# Patient Record
Sex: MALE | Race: WHITE | Employment: FULL TIME | URBAN - METROPOLITAN AREA
[De-identification: names, ages, dates, MRNs, and addresses within clinical notes are randomized per-mention and may not be internally consistent; named-entity substitution may affect disease eponyms.]

---

## 2022-08-30 ENCOUNTER — OFFICE VISIT (OUTPATIENT)
Dept: CARDIOLOGY CLINIC | Age: 57
End: 2022-08-30
Payer: COMMERCIAL

## 2022-08-30 VITALS
WEIGHT: 275 LBS | OXYGEN SATURATION: 96 % | DIASTOLIC BLOOD PRESSURE: 86 MMHG | BODY MASS INDEX: 33.49 KG/M2 | HEART RATE: 75 BPM | HEIGHT: 76 IN | SYSTOLIC BLOOD PRESSURE: 128 MMHG | RESPIRATION RATE: 22 BRPM

## 2022-08-30 DIAGNOSIS — E78.00 HYPERCHOLESTEREMIA: ICD-10-CM

## 2022-08-30 DIAGNOSIS — I47.1 PSVT (PAROXYSMAL SUPRAVENTRICULAR TACHYCARDIA) (HCC): Primary | ICD-10-CM

## 2022-08-30 DIAGNOSIS — I21.A1 MYOCARDIAL INFARCTION TYPE 2 (HCC): ICD-10-CM

## 2022-08-30 DIAGNOSIS — I77.810 AORTIC ROOT DILATION (HCC): ICD-10-CM

## 2022-08-30 DIAGNOSIS — I10 HYPERTENSION, ESSENTIAL: ICD-10-CM

## 2022-08-30 DIAGNOSIS — I51.7 ATRIAL ENLARGEMENT, BILATERAL: ICD-10-CM

## 2022-08-30 DIAGNOSIS — E66.09 CLASS 1 OBESITY DUE TO EXCESS CALORIES WITHOUT SERIOUS COMORBIDITY WITH BODY MASS INDEX (BMI) OF 33.0 TO 33.9 IN ADULT: ICD-10-CM

## 2022-08-30 DIAGNOSIS — E03.9 HYPOTHYROIDISM, UNSPECIFIED TYPE: ICD-10-CM

## 2022-08-30 DIAGNOSIS — R00.2 PALPITATIONS: ICD-10-CM

## 2022-08-30 DIAGNOSIS — R77.8 ELEVATED TROPONIN LEVEL NOT DUE TO ACUTE CORONARY SYNDROME: ICD-10-CM

## 2022-08-30 PROCEDURE — 93000 ELECTROCARDIOGRAM COMPLETE: CPT | Performed by: SPECIALIST

## 2022-08-30 PROCEDURE — 99204 OFFICE O/P NEW MOD 45 MIN: CPT | Performed by: SPECIALIST

## 2022-08-30 RX ORDER — METOPROLOL SUCCINATE 25 MG/1
TABLET, EXTENDED RELEASE ORAL
COMMUNITY
Start: 2022-08-24

## 2022-08-30 RX ORDER — IVERMECTIN 10 MG/G
CREAM TOPICAL
COMMUNITY

## 2022-08-30 RX ORDER — HYDROGEN PEROXIDE 3 %
SOLUTION, NON-ORAL MISCELLANEOUS
COMMUNITY
Start: 2007-12-01

## 2022-08-30 RX ORDER — SIMVASTATIN 40 MG/1
40 TABLET, FILM COATED ORAL EVERY EVENING
COMMUNITY
Start: 2022-08-21

## 2022-08-30 RX ORDER — KETOCONAZOLE 20 MG/ML
SHAMPOO TOPICAL
COMMUNITY

## 2022-08-30 RX ORDER — MONTELUKAST SODIUM 10 MG/1
10 TABLET ORAL DAILY
COMMUNITY
Start: 2022-08-19

## 2022-08-30 RX ORDER — CLOBETASOL PROPIONATE 0.5 MG/G
AEROSOL, FOAM TOPICAL
COMMUNITY

## 2022-08-30 RX ORDER — LEVOTHYROXINE SODIUM 50 UG/1
TABLET ORAL
COMMUNITY
Start: 2022-07-19

## 2022-08-30 RX ORDER — OMEPRAZOLE 40 MG/1
CAPSULE, DELAYED RELEASE ORAL
COMMUNITY
Start: 2022-08-13

## 2022-08-30 RX ORDER — LEVOTHYROXINE SODIUM 100 UG/1
CAPSULE ORAL
COMMUNITY
Start: 2007-12-01

## 2022-08-30 NOTE — Clinical Note
10/2/2022    Patient: Lucía Blackwell   YOB: 1965   Date of Visit: 8/30/2022     Janelle Mckeon MD  [address] [address]  Via Fax: [phone]    Dear Janelle Mckeon MD,      Thank you for referring Mr. Monie Gomez to [address] for evaluation. My notes for this consultation are attached. If you have questions, please do not hesitate to call me. I look forward to following your patient along with you.       Sincerely,    Charito Snyder MD

## 2022-10-02 PROBLEM — R00.2 PALPITATIONS: Status: ACTIVE | Noted: 2022-10-02

## 2022-10-02 PROBLEM — E66.811 CLASS 1 OBESITY DUE TO EXCESS CALORIES WITHOUT SERIOUS COMORBIDITY WITH BODY MASS INDEX (BMI) OF 33.0 TO 33.9 IN ADULT: Status: ACTIVE | Noted: 2022-10-02

## 2022-10-02 PROBLEM — I51.7 ATRIAL ENLARGEMENT, BILATERAL: Status: ACTIVE | Noted: 2022-10-02

## 2022-10-02 PROBLEM — I47.1 PSVT (PAROXYSMAL SUPRAVENTRICULAR TACHYCARDIA) (HCC): Status: ACTIVE | Noted: 2022-10-02

## 2022-10-02 PROBLEM — E78.00 HYPERCHOLESTEREMIA: Status: ACTIVE | Noted: 2022-10-02

## 2022-10-02 PROBLEM — R77.8 ELEVATED TROPONIN LEVEL NOT DUE TO ACUTE CORONARY SYNDROME: Status: ACTIVE | Noted: 2022-10-02

## 2022-10-02 PROBLEM — R79.89 ELEVATED TROPONIN LEVEL NOT DUE TO ACUTE CORONARY SYNDROME: Status: ACTIVE | Noted: 2022-10-02

## 2022-10-02 PROBLEM — E03.9 HYPOTHYROIDISM: Status: ACTIVE | Noted: 2022-10-02

## 2022-10-02 PROBLEM — I10 HYPERTENSION, ESSENTIAL: Status: ACTIVE | Noted: 2022-10-02

## 2022-10-02 PROBLEM — I47.10 PSVT (PAROXYSMAL SUPRAVENTRICULAR TACHYCARDIA): Status: ACTIVE | Noted: 2022-10-02

## 2022-10-02 PROBLEM — E66.09 CLASS 1 OBESITY DUE TO EXCESS CALORIES WITHOUT SERIOUS COMORBIDITY WITH BODY MASS INDEX (BMI) OF 33.0 TO 33.9 IN ADULT: Status: ACTIVE | Noted: 2022-10-02

## 2022-10-02 PROBLEM — I77.810 AORTIC ROOT DILATION (HCC): Status: ACTIVE | Noted: 2022-10-02

## 2022-10-03 NOTE — PROGRESS NOTES
Florence Moreno Ed     1965       Khanh Parra MD, [facility]  Date of Visit-08/30/2022   PCP is Nadege Pepper MD   [facility] [place]  [facility]  HPI:  Adán Correa is a 56 y.o. male   New patient with hx of SVT-had episode while traveling in [facility] in 8/23/22  Takes BB for past week, also takes statin, thyroid med and GERD med for past 15 years   EKG:   NSR at 77 , RSR' in V1 , QRS is 110 msec so incomplete RBBB with borderline left axis  Discussed with patient and records reviewed   [facility] in [place] echocardiogram 8/24/2022 mild increase in left atrial size LVEF 67% mild right atrial enlargement. Normal RV trivial MR and TR no significant valve disease normal diastolic filling proximal aorta moderately enlarged measurement appears to be 4.5 cm at the root a sending 4.2 cm. ER record coronary CT angiogram normal coronaries calcium score of 0 EF 63% EKG sinus rhythm left axis deviation LVH possibly  Discharge summary 8/23/2022 to 8/24/2022 admission for SVT history of hypertension dyslipidemia hypothyroidism obesity presented with chest pain found of SVT by EMS got adenosine troponin was elevated 0.41 and he was considered to have a type II NSTEMI and thus underwent the coronary CTA. No rhythm strips were available from the field patient was discharged on metoprolol. His LDL is 142 and is on simvastatin 40 recently prescribed metformin for weight loss. He is also been on phentermine. Assessment/Plan: This patient had SVT on 8/23/2022 thought possibly related to phentermine which has been stopped. He was placed at that time on metoprolol his TSH was normal troponin was elevated at 0.41 and he underwent a coronary CTA which was normal.  His echo was remarkable only for an aortic root size of 4.5 cm and mild biatrial enlargement. He has felt some symptoms of palpitations shortness of breath but they have all improved.     We went over SVT and its episodic nature this could have been provoked from tyramine but he also could have a differential diagnosis of an AV shayy reentry tachycardia PAT or even PAF. Unfortunately do not have strips from when he was in the emergency room or EMS in the hospital in [facility] did not have EMS records of the strips. He did respond to adenosine which is helpful to identify as a likely SVT. We talked about possible ablation if the symptoms were to persist and condition persist but that in many patients this may be a single isolated episode and his may have had a trigger. Much of this information of the record is reviewed with him and was knowledgeable to him at that time. Overall we will plan a period of watchful waiting since he is already had an echo and coronary CTA no further testing is needed if he has prolonged and persistent episodes he should be considered for ablation therapy or continue medical therapy but perhaps with a and further antiarrhythmic. That would be a patient choice though often times ablation can be curative which we discussed. If he does not have a lot of episodes I think at some point he can be weaned off the stop the beta-blocker and then monitored for further events. He is comfortable with plans and will see us back in December. Patient Instructions   We will see you back in December. The primary encounter diagnosis was PSVT (paroxysmal supraventricular tachycardia) ([facility] [address] Diagnoses of Myocardial infarction type 2 [facility]), Atrial enlargement, bilateral, Aortic root dilation (HCC), Palpitations, Hypertension, essential, Hypercholesteremia, Elevated troponin level not due to acute coronary syndrome, Hypothyroidism, unspecified type, and Class 1 obesity due to excess calories without serious comorbidity with body mass index (BMI) of 33.0 to 33.9 in adult were also pertinent to this visit. Impression:    1. PSVT (paroxysmal supraventricular tachycardia) ([address]    2. Myocardial infarction type 2 ([address]    3. Atrial enlargement, bilateral    4. Aortic root dilation (HCC)    5. Palpitations    6. Hypertension, essential    7. Hypercholesteremia    8. Elevated troponin level not due to acute coronary syndrome    9. Hypothyroidism, unspecified type    10. Class 1 obesity due to excess calories without serious comorbidity with body mass index (BMI) of 33.0 to 33.9 in adult       Future Appointments   Date Time Provider Joni Frosti   2022  8:40 AM MD KARISHMA Pineda AMB      Patient Care Team:  Wicho Muniz MD as PCP - General (Family Medicine)  Wicho Muniz MD as PCP - [facility] Provider     Medical Hx= Jaya Germain  No prior cardiac cath, blood transfusion, or GI bleeding  Social Hx= No  tobacco, 6-8 alcohol drinks a week, 2 children, , [employer], drinks 2 cups coffee daily  Family Hx= Mother  76 of colon cancer , Father  64 of MI , Siblings brother  at 54 of Etoh abuse and MI; sister  at 54 of asphyixation due to possile OD    Allergies   Allergen Reactions    Codeine Nausea and Vomiting          ROS:  REVIEW OF SYMPTOMS: A  full 12 system ROS is reviewed with aid of new patient form  see scanned new patient worksheet. Review of Systems   Constitutional:  Negative for diaphoresis, fever and malaise/fatigue. HENT:  Positive for tinnitus. Negative for ear pain, hearing loss and nosebleeds. Eyes:  Negative for blurred vision, double vision and pain. Respiratory:  Positive for shortness of breath. Negative for cough, hemoptysis, sputum production, wheezing and stridor. Cardiovascular:  Positive for palpitations. Negative for chest pain, orthopnea, claudication and leg swelling. Gastrointestinal:  Positive for heartburn. Negative for abdominal pain, blood in stool, constipation, diarrhea, nausea and vomiting. Genitourinary:  Positive for frequency. Negative for dysuria and urgency.    Musculoskeletal: Negative for back pain, falls and joint pain. Skin:  Negative for rash. Neurological:  Positive for dizziness. Negative for seizures, loss of consciousness, weakness and headaches. Endo/Heme/Allergies:  Does not bruise/bleed easily. Psychiatric/Behavioral:  Negative for depression, hallucinations and memory loss. The patient is not nervous/anxious and does not have insomnia. Exam and Labs:  Visit Vitals  /86 (BP 1 Location: Left upper arm, BP Patient Position: Sitting, BP Cuff Size: Adult)   Pulse 75   Resp 22   Ht 6' 4\" (1.93 m)   Wt 275 lb (124.7 kg)   SpO2 96%   BMI 33.47 kg/m²      Constitutional:  NAD, comfortable ,   Head: NC,AT. Eyes: No scleral icterus. Neck:  Neck supple. No JVD present. Throat: moist mucous membranes. Chest: Effort normal & normal respiratory excursion . Neurological: alert, conversant and oriented . Skin: Skin is not cold. No obvious systemic rash noted. Not diaphoretic. No erythema. Psychiatric:  Grossly normal mood and affect. Behavior appears normal. Extremities:  no clubbing or cyanosis. Abdomen: non distended    Lungs:breath sounds normal. No stridor. distress, wheezes or  Rales. Heart:normal rate, regular rhythm, normal S1, S2, no murmurs, rubs, clicks or gallops , PMI non displaced. Edema: Edema is none. No results found for: CHOL, CHOLX, CHLST, CHOLV, HDL, HDLP, LDL, LDLC, DLDLP, TGLX, TRIGL, TRIGP, CHHD, CHHDX  No results found for this or any previous visit.  No results found for: NA, K, CL, CO2, AGAP, GLU, BUN, CREA, BUCR, GFRAA, GFRNA, CA, GFRAA   Wt Readings from Last 3 Encounters:   08/30/22 275 lb (124.7 kg)      BP Readings from Last 3 Encounters:   08/30/22 128/86        Current Outpatient Medications   Medication Sig    clobetasoL (OLUX) 0.05 % topical foam 1 application to affected area    esomeprazole (NEXIUM) 20 mg capsule     ivermectin 1 % crea APPLY 1 (ONE) APPLICATION TO THE ENTIRE FACE TWICE A DAY, MAY DECREASE TO ONCE A DAY IF IMPROVED    ketoconazole (NIZORAL) 2 % shampoo     levothyroxine (SYNTHROID) 50 mcg tablet     metoprolol succinate (TOPROL-XL) 25 mg XL tablet     montelukast (SINGULAIR) 10 mg tablet Take 10 mg by mouth daily. omeprazole (PRILOSEC) 40 mg capsule     simvastatin (ZOCOR) 40 mg tablet Take 40 mg by mouth every evening. Levothyroxine 100 mcg cap  (Patient not taking: Reported on 8/30/2022)     No current facility-administered medications for this visit. Impression see above.

## 2022-12-12 ENCOUNTER — OFFICE VISIT (OUTPATIENT)
Dept: CARDIOLOGY CLINIC | Age: 57
End: 2022-12-12
Payer: COMMERCIAL

## 2022-12-12 VITALS
RESPIRATION RATE: 18 BRPM | HEIGHT: 76 IN | OXYGEN SATURATION: 98 % | DIASTOLIC BLOOD PRESSURE: 82 MMHG | WEIGHT: 276 LBS | HEART RATE: 65 BPM | SYSTOLIC BLOOD PRESSURE: 136 MMHG | BODY MASS INDEX: 33.61 KG/M2

## 2022-12-12 DIAGNOSIS — I21.A1 MYOCARDIAL INFARCTION TYPE 2 (HCC): ICD-10-CM

## 2022-12-12 DIAGNOSIS — I77.810 AORTIC ROOT DILATION (HCC): ICD-10-CM

## 2022-12-12 DIAGNOSIS — I47.1 PSVT (PAROXYSMAL SUPRAVENTRICULAR TACHYCARDIA) (HCC): Primary | ICD-10-CM

## 2022-12-12 DIAGNOSIS — E78.00 HYPERCHOLESTEREMIA: ICD-10-CM

## 2022-12-12 DIAGNOSIS — I10 HYPERTENSION, ESSENTIAL: ICD-10-CM

## 2022-12-12 PROCEDURE — 3079F DIAST BP 80-89 MM HG: CPT | Performed by: SPECIALIST

## 2022-12-12 PROCEDURE — 99214 OFFICE O/P EST MOD 30 MIN: CPT | Performed by: SPECIALIST

## 2022-12-12 PROCEDURE — 3074F SYST BP LT 130 MM HG: CPT | Performed by: SPECIALIST

## 2022-12-12 NOTE — PROGRESS NOTES
Chin Ward     1965       Khanh Parra MD, [facility]  Date of Visit-12/12/2022   PCP is Devi Ralph MD   [facility] and [facility] [place]  [facility]  HPI:  Dinesh Payton is a 56 y.o. male   4 month follow seen in August with hx of   SVT-had episode while traveling in [facility] in 8/23/22--Takes BB for past week, also takes statin, thyroid med and GERD med for past 15 years   Previous EKG:   NSR at 77 , RSR' in V1 , QRS is 110 msec so incomplete RBBB with borderline left axis  [facility] in [facility] echocardiogram 8/24/2022 mild increase in left atrial size LVEF 67% mild right atrial enlargement. Normal RV trivial MR and TR no significant valve disease normal diastolic filling proximal aorta moderately enlarged measurement appears to be 4.5 cm at the root a sending 4.2 cm. ER record coronary CT angiogram normal coronaries calcium score of 0 EF 63% , EKG sinus rhythm left axis deviation LVH possibly  Discharge summary 8/23/2022 to 8/24/2022 admission for SVT history of hypertension dyslipidemia hypothyroidism obesity presented with chest pain found of   VT by EMS got adenosine troponin was elevated 0.41 and he was considered to have a type II NSTEMI and thus underwent the coronary CTA. No rhythm strips were available from the field patient was discharged on metoprolol. His LDL is 142 and is on simvastatin 40 recently prescribed metformin for weight loss. He is also been on phentermine. Today doing well no complaints  Starting to exercise more  Denies chest pain, edema, syncope or shortness of breath at rest   Has no tachycardia , palpitations or sense of arrythmia    Assessment/Plan:   Patient Instructions   Your blood pressure once a day after get a blood pressure cuff. Once you have 10 numbers let us know what the values are. If we then decide to stop the metoprolol then get another 10 days of values and let us know.   Our goal was for the top number the systolic pressure to average around 120 when you are in the resting position but will accept below 135. You can hold onto the beta-blocker to use 1 or 2 tablets if you develop an acute episode of SVT and do not forget the vagal maneuvers. I will see you back in a year   1. PSVT (paroxysmal supraventricular tachycardia) ([address]  Single episode while traveling in [facility] had an extensive work-up and has been on a beta-blocker. He can stop that now and then use that as needed in addition to vagal maneuvers. We went over if it became frequent that he can consider ablation but at this point this may be infrequent and daily medicine would not be needed. If he needs a beta-blocker for blood pressure anyway that we will continue    2. Aortic root dilation (HCC)  Is a tall man with a BMI of 33 so his aortic root size is not particularly aneurysmal.  After his next visit in a year we will follow-up with an echo likely. 3. Myocardial infarction type 2 ([address]  Normal coronaries by CTA    4. Hypertension, essential  BP is borderline see above for plans   5. Hypercholesteremia  up with PCP      This patient had SVT on 8/23/2022 thought possibly related to phentermine which has been stopped. He was placed at that time on metoprolol his TSH was normal troponin was elevated at 0.41 and he underwent a coronary CTA which was normal.  His echo was remarkable only for an aortic root size of 4.5 cm and mild biatrial enlargement. Impression:    1. PSVT (paroxysmal supraventricular tachycardia) ([address]    2. Aortic root dilation (HCC)    3. Myocardial infarction type 2 ([address]    4. Hypertension, essential    5. Hypercholesteremia       No future appointments.      Patient Care Team:  Jewel Trivedi MD as PCP - General (Family Medicine)  Jewel Trivedi MD as PCP - [facility]     PSVT  normal cors CTA  Medical Hx= XOL,GERD  No prior cardiac cath, blood transfusion, or GI bleeding  Social Hx= No  tobacco, 6-8 alcohol drinks a week, 2 children, , likes golf, drinks 2 cups coffee daily  Family Hx= Mother  76 of colon cancer , Father  64 of MI , Siblings brother  at 54 of Etoh abuse and MI; sister  at 54 of asphyixation due to possile OD     Allergies   Allergen Reactions    Codeine Nausea and Vomiting          ROS:  ROS:Cardiac as above. Respiratory as above with no wheezing or hemoptysis. He denies  symptoms of unusual weight loss , fevers, night sweats, BRBPR, hematuria, seizure or recent stroke        Exam and Labs:  Visit Vitals  /82 (BP 1 Location: Left upper arm, BP Patient Position: Sitting, BP Cuff Size: Adult)   Pulse 65   Resp 18   Ht 6' 4\" (1.93 m)   Wt 276 lb (125.2 kg)   SpO2 98%   BMI 33.60 kg/m²        Constitutional:  NAD, comfortable ,   Head: NC,AT. Eyes: No scleral icterus. Neck:  Neck supple. No JVD present. Throat: moist mucous membranes. Chest: Effort normal & normal respiratory excursion . Neurological: alert, conversant and oriented . Skin: Skin is not cold. No obvious systemic rash noted. Not diaphoretic. No erythema. Psychiatric:  Grossly normal mood and affect. Behavior appears normal. Extremities:  no clubbing or cyanosis. Abdomen: non distended    Lungs:breath sounds normal. No stridor. distress, wheezes or  Rales. Heart:normal rate, regular rhythm, normal S1, S2, no murmurs, rubs, clicks or gallops , PMI non displaced. Edema: Edema is none. No results found for: CHOL, CHOLX, CHLST, CHOLV, HDL, HDLP, LDL, LDLC, DLDLP, TGLX, TRIGL, TRIGP, CHHD, CHHDX  No results found for this or any previous visit.  No results found for: NA, K, CL, CO2, AGAP, GLU, BUN, CREA, BUCR, GFRAA, GFRNA, CA, GFRAA   Wt Readings from Last 3 Encounters:   22 276 lb (125.2 kg)   22 275 lb (124.7 kg)      BP Readings from Last 3 Encounters:   22 136/82   22 128/86        Current Outpatient Medications   Medication Sig clobetasoL (OLUX) 0.05 % topical foam 1 application to affected area    esomeprazole (NEXIUM) 20 mg capsule     ivermectin 1 % crea APPLY 1 (ONE) APPLICATION TO THE ENTIRE FACE TWICE A DAY, MAY DECREASE TO ONCE A DAY IF IMPROVED    ketoconazole (NIZORAL) 2 % shampoo     levothyroxine (SYNTHROID) 50 mcg tablet     metoprolol succinate (TOPROL-XL) 25 mg XL tablet     omeprazole (PRILOSEC) 40 mg capsule     simvastatin (ZOCOR) 40 mg tablet Take 40 mg by mouth every evening. Levothyroxine 100 mcg cap  (Patient not taking: No sig reported)    montelukast (SINGULAIR) 10 mg tablet Take 10 mg by mouth daily. (Patient not taking: Reported on 12/12/2022)     No current facility-administered medications for this visit. Impression see above.

## 2022-12-12 NOTE — PATIENT INSTRUCTIONS
Please check your blood pressure once a day after you get a blood pressure cuff. Once you have 10 numbers let us know what the values are via [facility] [place]. If we then decide to stop the metoprolol then get another 10 days of values and let us know. Our goal was for the top number the systolic pressure to average around 120 when you are in the resting position but will accept below 135. You can hold onto the beta-blocker to use 1 or 2 tablets if you develop an acute episode of SVT and do not forget the vagal maneuvers. I will see you back in a year.

## 2022-12-12 NOTE — Clinical Note
12/12/2022    Patient: Dinesh Payton   YOB: 1965   Date of Visit: 12/12/2022     Chester Meadows MD  [address] [address]  Via Fax: [phone]    Dear Chester Meadows MD,      Thank you for referring Mr. Kayode Holbrook to [address]  for evaluation. My notes for this consultation are attached. If you have questions, please do not hesitate to call me. I look forward to following your patient along with you.       Sincerely,    Lorrayne Opitz, MD

## 2024-02-15 ENCOUNTER — OFFICE VISIT (OUTPATIENT)
Age: 59
End: 2024-02-15
Payer: COMMERCIAL

## 2024-02-15 VITALS
BODY MASS INDEX: 36.19 KG/M2 | SYSTOLIC BLOOD PRESSURE: 120 MMHG | DIASTOLIC BLOOD PRESSURE: 80 MMHG | HEART RATE: 63 BPM | WEIGHT: 297.2 LBS | OXYGEN SATURATION: 97 % | HEIGHT: 76 IN

## 2024-02-15 DIAGNOSIS — R00.2 PALPITATIONS: ICD-10-CM

## 2024-02-15 DIAGNOSIS — E78.00 PURE HYPERCHOLESTEROLEMIA, UNSPECIFIED: ICD-10-CM

## 2024-02-15 DIAGNOSIS — I77.810 THORACIC AORTIC ECTASIA (HCC): ICD-10-CM

## 2024-02-15 DIAGNOSIS — I21.A1 MYOCARDIAL INFARCTION TYPE 2 (HCC): ICD-10-CM

## 2024-02-15 DIAGNOSIS — I47.10 SUPRAVENTRICULAR TACHYCARDIA: ICD-10-CM

## 2024-02-15 DIAGNOSIS — I20.89 OTHER FORMS OF ANGINA PECTORIS: Primary | ICD-10-CM

## 2024-02-15 PROCEDURE — 93000 ELECTROCARDIOGRAM COMPLETE: CPT | Performed by: SPECIALIST

## 2024-02-15 PROCEDURE — 99214 OFFICE O/P EST MOD 30 MIN: CPT | Performed by: SPECIALIST

## 2024-02-15 PROCEDURE — 3074F SYST BP LT 130 MM HG: CPT | Performed by: SPECIALIST

## 2024-02-15 PROCEDURE — 3079F DIAST BP 80-89 MM HG: CPT | Performed by: SPECIALIST

## 2024-02-15 RX ORDER — NEBIVOLOL 5 MG/1
TABLET ORAL
COMMUNITY
Start: 2023-11-06

## 2024-02-15 RX ORDER — ROSUVASTATIN CALCIUM 20 MG/1
1 TABLET, COATED ORAL
COMMUNITY

## 2024-02-15 NOTE — PATIENT INSTRUCTIONS
You have been scheduled for a stress echocardiogram. Please arrive 15 minutes prior to your appointment. Wear comfortable clothes and shoes. Please do not eat or drink anything other than water two hours prior to test. Please do not take your Bystolic 24 hours prior.    We will send results on Final[employer] and see you back for an annual follow up.

## 2024-02-15 NOTE — PROGRESS NOTES
Mayo Prieto     1965       Chris Navarro MD, [facility]    PCP is Yoav Hernandes MD   [facility] [place]  [facility]  Date of visit 2/15/2024   HPI:  Mayo Prieto is a 57 y.o. male   Last office visit 12/12/2022 for   SVT aortic root dilation type II MI hypertension dyslipidemia    Mayo comes back to see us today he has a history of PSVT.    He is generally done well.  Previously it was possibly associate with phentermine.    He was doing pretty well but had noticed some weight gain.  He started on a medicine that had a small dose of phentermine in 2 weeks lost 15 pounds last June but then had an episode at night of the rapid tachycardia.    He is also had a left-sided chest pain burning for about a year.  That chest pain is now increasing in frequency up to 3-4 times a week.    The tachycardia occurred last June and has not recurred since then.    He has mild edema in his legs.    He has no palpitations currently no shortness of breath or fever.    Chest pain is left-sided not necessarily associate with exertion.    He takes Bystolic.  The hope is been he can take phentermine and the Bystolic helped him but even with that he got SVT so he is just on Bystolic without phentermine.      Previously   SVT-had episode while traveling in [facility] in 8/23/22--Takes BB for past week, also takes statin, thyroid med and GERD med for past 15 years   Previous EKG:   NSR at 66 , RSR' in V1 , QRS is 110 msec so incomplete RBBB with borderline left axis  [facility] in [place] echocardiogram 8/24/2022 mild increase in left atrial size LVEF 67% mild right atrial enlargement.  Normal RV trivial MR and TR no significant valve disease normal diastolic filling proximal aorta moderately enlarged measurement appears to be 4.5 cm at the root a sending 4.2 cm.  ER record coronary CT angiogram normal coronaries calcium score of 0 EF 63% , EKG sinus rhythm left axis

## 2024-03-18 ENCOUNTER — TELEPHONE (OUTPATIENT)
Age: 59
End: 2024-03-18

## 2024-03-18 NOTE — TELEPHONE ENCOUNTER
Verified patient with two types of identifiers. Went over normal stress echo results with Mr. Prieto. He was appreciative. Confirmed follow up.    Future Appointments   Date Time Provider Department Center   2/19/2025  8:20 AM Chris Navarro MD [facility]

## 2025-03-26 ENCOUNTER — OFFICE VISIT (OUTPATIENT)
Age: 60
End: 2025-03-26

## 2025-03-26 VITALS
HEART RATE: 69 BPM | BODY MASS INDEX: 32.63 KG/M2 | HEIGHT: 76 IN | DIASTOLIC BLOOD PRESSURE: 78 MMHG | SYSTOLIC BLOOD PRESSURE: 120 MMHG | OXYGEN SATURATION: 94 % | WEIGHT: 268 LBS

## 2025-03-26 DIAGNOSIS — I47.10 SUPRAVENTRICULAR TACHYCARDIA: ICD-10-CM

## 2025-03-26 DIAGNOSIS — I77.810 THORACIC AORTIC ECTASIA: ICD-10-CM

## 2025-03-26 DIAGNOSIS — E78.00 PURE HYPERCHOLESTEROLEMIA, UNSPECIFIED: ICD-10-CM

## 2025-03-26 DIAGNOSIS — I21.A1 MYOCARDIAL INFARCTION TYPE 2 (HCC): ICD-10-CM

## 2025-03-26 DIAGNOSIS — R07.2 PRECORDIAL CHEST PAIN: Primary | ICD-10-CM

## 2025-03-26 RX ORDER — TIRZEPATIDE 7.5 MG/.5ML
INJECTION, SOLUTION SUBCUTANEOUS
COMMUNITY
Start: 2024-10-28

## 2025-03-26 ASSESSMENT — PATIENT HEALTH QUESTIONNAIRE - PHQ9
SUM OF ALL RESPONSES TO PHQ QUESTIONS 1-9: 0
1. LITTLE INTEREST OR PLEASURE IN DOING THINGS: NOT AT ALL
SUM OF ALL RESPONSES TO PHQ QUESTIONS 1-9: 0
2. FEELING DOWN, DEPRESSED OR HOPELESS: NOT AT ALL
SUM OF ALL RESPONSES TO PHQ QUESTIONS 1-9: 0
SUM OF ALL RESPONSES TO PHQ QUESTIONS 1-9: 0

## 2025-05-01 ENCOUNTER — TELEPHONE (OUTPATIENT)
Age: 60
End: 2025-05-01

## 2025-05-01 DIAGNOSIS — I77.810 DILATED AORTIC ROOT: ICD-10-CM

## 2025-05-01 DIAGNOSIS — I77.810 THORACIC AORTIC ECTASIA: Primary | ICD-10-CM

## 2025-05-01 NOTE — TELEPHONE ENCOUNTER
Verified patient with two types of identifiers. Advised Mr. Prieto to have chest CTA completed at his convenience for routine monitoring of dilated aortic root. Provided the number for Central Scheduling and let him know Dr. Navarro will send results and plan on [employer].     Future Appointments   Date Time Provider Department Center   3/27/2026  8:40 AM Carmina Berry ACNP [facility]

## 2025-05-15 ENCOUNTER — HOSPITAL ENCOUNTER (OUTPATIENT)
Facility: HOSPITAL | Age: 60
Discharge: HOME | End: 2025-05-18
Attending: SPECIALIST
Payer: COMMERCIAL

## 2025-05-15 DIAGNOSIS — I77.810 THORACIC AORTIC ECTASIA: ICD-10-CM

## 2025-05-15 DIAGNOSIS — I77.810 DILATED AORTIC ROOT: ICD-10-CM

## 2025-05-15 PROCEDURE — 71275 CT ANGIOGRAPHY CHEST: CPT

## 2025-05-15 PROCEDURE — 6360000004 HC RX CONTRAST MEDICATION: Performed by: SPECIALIST

## 2025-05-15 RX ORDER — IOPAMIDOL 755 MG/ML
100 INJECTION, SOLUTION INTRAVASCULAR
Status: COMPLETED | OUTPATIENT
Start: 2025-05-15 | End: 2025-05-15

## 2025-05-15 RX ADMIN — IOPAMIDOL 100 ML: 755 INJECTION, SOLUTION INTRAVENOUS at 11:24
